# Patient Record
Sex: FEMALE
[De-identification: names, ages, dates, MRNs, and addresses within clinical notes are randomized per-mention and may not be internally consistent; named-entity substitution may affect disease eponyms.]

---

## 2019-02-05 PROBLEM — Z00.00 ENCOUNTER FOR PREVENTIVE HEALTH EXAMINATION: Status: ACTIVE | Noted: 2019-02-05

## 2019-02-15 ENCOUNTER — APPOINTMENT (OUTPATIENT)
Dept: NEUROSURGERY | Facility: CLINIC | Age: 74
End: 2019-02-15
Payer: MEDICARE

## 2019-02-15 VITALS
HEART RATE: 75 BPM | DIASTOLIC BLOOD PRESSURE: 75 MMHG | SYSTOLIC BLOOD PRESSURE: 149 MMHG | OXYGEN SATURATION: 95 % | RESPIRATION RATE: 16 BRPM

## 2019-02-15 DIAGNOSIS — M51.36 OTHER INTERVERTEBRAL DISC DEGENERATION, LUMBAR REGION: ICD-10-CM

## 2019-02-15 DIAGNOSIS — M54.10 RADICULOPATHY, SITE UNSPECIFIED: ICD-10-CM

## 2019-02-15 DIAGNOSIS — Z78.9 OTHER SPECIFIED HEALTH STATUS: ICD-10-CM

## 2019-02-15 DIAGNOSIS — M54.5 LOW BACK PAIN: ICD-10-CM

## 2019-02-15 DIAGNOSIS — E11.9 TYPE 2 DIABETES MELLITUS W/OUT COMPLICATIONS: ICD-10-CM

## 2019-02-15 PROCEDURE — 99205 OFFICE O/P NEW HI 60 MIN: CPT

## 2019-02-16 PROBLEM — E11.9 DIABETES: Status: RESOLVED | Noted: 2019-02-16 | Resolved: 2019-02-16

## 2019-02-16 PROBLEM — Z78.9 NON-SMOKER: Status: ACTIVE | Noted: 2019-02-16

## 2019-02-16 PROBLEM — M51.36 LUMBAR DEGENERATIVE DISC DISEASE: Status: ACTIVE | Noted: 2019-02-16

## 2019-02-16 PROBLEM — M54.5 LOW BACK PAIN: Status: ACTIVE | Noted: 2019-02-15

## 2019-02-16 NOTE — HISTORY OF PRESENT ILLNESS
[de-identified] : 73 year old woman with past medical history diabetes referred by Dr. Mary Babcock for comprehensive neurosurgical evaluation of lumbar spine. She reports a history of low back pain radiating to bilateral legs, RIGHT worse than LEFT for several years. She reports that pain is exacerbated with walking. Denies numbness/tingling.\par \par Has done physical therapy with minimal improvement. She has not been evaluated by pain management. \par She brings MRI lumbar spine from 7/20/18 for review. \par Denies leg weakness, bowel/bladder incontinence.

## 2019-02-16 NOTE — CONSULT LETTER
[FreeTextEntry2] : Dr. Mary Babcock\par [address]\[place] [address] [FreeTextEntry3] : Patricio Jacobson MD

## 2019-02-16 NOTE — DATA REVIEWED
[de-identified] : MRI lumbar spine 7/20/18: Impression:\par There is mild dextroscoliosis of the lumbar spine which may be in part positional.\par \par Mild grade 1 anterolisthesis at the L4-L5 level.\par \par Multilevel degenerative disc and endplate changes with hypertrophy of the ligamentum flavum and degenerative changes of the facet joints, as described. Accounting for differences in technique, there are no significant changes when compared to the prior MRI of the lumbar spine of 3/17/2011. \par \par L3-L4: Mild canal stenosis with moderate narrowing of the left lateral recess and neural foramen and mild right foraminal narrowing, THere is contact of the exiting left L3 nerve root and likely of the descending left L4 nerve root.\par \par L2-L3: Mild canal stenosis and moderate left and mild right foraminal narrowing. There is contact of the esiting Left L2 nerve root.\par \par L4-L5: Mild canal stenosis with moderate right and left foraminal narrowing. There is encroachment upon the exiting right L4 nerve root.

## 2019-02-16 NOTE — PHYSICAL EXAM
[Oriented To Time, Place, And Person] : oriented to person, place, and time [Motor Tone] : muscle tone was normal in all four extremities [Motor Strength] : muscle strength was normal in all four extremities [Sclera] : the sclera and conjunctiva were normal [Outer Ear] : the ears and nose were normal in appearance [Neck Appearance] : the appearance of the neck was normal [] : no respiratory distress [Respiration, Rhythm And Depth] : normal respiratory rhythm and effort [Heart Rate And Rhythm] : heart rate was normal and rhythm regular [Abnormal Walk] : normal gait [Skin Color & Pigmentation] : normal skin color and pigmentation

## 2019-02-16 NOTE — CONSULT LETTER
[FreeTextEntry2] : Dr. Mary Babcock\par [address]\[place] [address] [FreeTextEntry3] : Patricio Jacobson MD

## 2019-02-16 NOTE — DATA REVIEWED
[de-identified] : MRI lumbar spine 7/20/18: Impression:\par There is mild dextroscoliosis of the lumbar spine which may be in part positional.\par \par Mild grade 1 anterolisthesis at the L4-L5 level.\par \par Multilevel degenerative disc and endplate changes with hypertrophy of the ligamentum flavum and degenerative changes of the facet joints, as described. Accounting for differences in technique, there are no significant changes when compared to the prior MRI of the lumbar spine of 3/17/2011. \par \par L3-L4: Mild canal stenosis with moderate narrowing of the left lateral recess and neural foramen and mild right foraminal narrowing, THere is contact of the exiting left L3 nerve root and likely of the descending left L4 nerve root.\par \par L2-L3: Mild canal stenosis and moderate left and mild right foraminal narrowing. There is contact of the esiting Left L2 nerve root.\par \par L4-L5: Mild canal stenosis with moderate right and left foraminal narrowing. There is encroachment upon the exiting right L4 nerve root.

## 2019-02-16 NOTE — PLAN
[FreeTextEntry1] : MRI lumbar spine reviewed by Dr. Jacobson with patient. \par No surgery recommended.\par Recommend physical therapy and consultation with pain management\par Educated regarding s/s neurological worsening including weakness, gait difficulty, incontinence severe pain, return to office or report to ED.\par \par Plan:\par \par 1. Physical therapy\par 2. Pain management - referral and contact information provided to Dr. Jeremi Guallpa\par \par Patient verbalizes agreement and understanding with plan.\par

## 2019-02-16 NOTE — HISTORY OF PRESENT ILLNESS
[de-identified] : 73 year old woman with past medical history diabetes referred by Dr. Mary Babcock for comprehensive neurosurgical evaluation of lumbar spine. She reports a history of low back pain radiating to bilateral legs, RIGHT worse than LEFT for several years. She reports that pain is exacerbated with walking. Denies numbness/tingling.\par \par Has done physical therapy with minimal improvement. She has not been evaluated by pain management. \par She brings MRI lumbar spine from 7/20/18 for review. \par Denies leg weakness, bowel/bladder incontinence.

## 2020-02-25 ENCOUNTER — APPOINTMENT (OUTPATIENT)
Dept: NEPHROLOGY | Facility: CLINIC | Age: 75
End: 2020-02-25
Payer: MEDICARE

## 2020-02-25 VITALS — HEART RATE: 70 BPM | SYSTOLIC BLOOD PRESSURE: 120 MMHG | DIASTOLIC BLOOD PRESSURE: 65 MMHG

## 2020-02-25 DIAGNOSIS — R30.0 DYSURIA: ICD-10-CM

## 2020-02-25 DIAGNOSIS — N18.3 CHRONIC KIDNEY DISEASE, STAGE 3 (MODERATE): ICD-10-CM

## 2020-02-25 DIAGNOSIS — I10 ESSENTIAL (PRIMARY) HYPERTENSION: ICD-10-CM

## 2020-02-25 PROCEDURE — 99203 OFFICE O/P NEW LOW 30 MIN: CPT

## 2020-02-25 RX ORDER — LOSARTAN POTASSIUM 50 MG/1
50 TABLET, FILM COATED ORAL
Refills: 0 | Status: ACTIVE | COMMUNITY

## 2020-02-25 RX ORDER — SIMVASTATIN 20 MG/1
20 TABLET, FILM COATED ORAL
Refills: 0 | Status: ACTIVE | COMMUNITY

## 2020-02-26 LAB
APPEARANCE: ABNORMAL
BACTERIA: NEGATIVE
BILIRUBIN URINE: NEGATIVE
BLOOD URINE: NEGATIVE
COLOR: NORMAL
GLUCOSE QUALITATIVE U: NEGATIVE
HYALINE CASTS: 0 /LPF
KETONES URINE: NEGATIVE
LEUKOCYTE ESTERASE URINE: ABNORMAL
MICROSCOPIC-UA: NORMAL
NITRITE URINE: NEGATIVE
PH URINE: 5
PROTEIN URINE: NEGATIVE
RED BLOOD CELLS URINE: 1 /HPF
SPECIFIC GRAVITY URINE: 1.02
SQUAMOUS EPITHELIAL CELLS: 4 /HPF
UROBILINOGEN URINE: NORMAL
WHITE BLOOD CELLS URINE: 27 /HPF

## 2020-02-27 LAB — BACTERIA UR CULT: NORMAL

## 2020-02-27 NOTE — PHYSICAL EXAM
[General Appearance - In No Acute Distress] : in no acute distress [General Appearance - Well-Appearing] : healthy appearing [Jugular Venous Distention Increased] : there was no jugular-venous distention [Heart Sounds] : normal S1 and S2 [Auscultation Breath Sounds / Voice Sounds] : lungs were clear to auscultation bilaterally [Heart Rate And Rhythm] : heart rate was normal and rhythm regular [Heart Sounds Gallop] : no gallops [Murmurs] : no murmurs [Heart Sounds Pericardial Friction Rub] : no pericardial rub [Edema] : there was no peripheral edema [Abdomen Tenderness] : non-tender [No CVA Tenderness] : no ~M costovertebral angle tenderness [Abdomen Soft] : soft [Abnormal Walk] : normal gait [Oriented To Time, Place, And Person] : oriented to person, place, and time [General Appearance - Alert] : alert [Sclera] : the sclera and conjunctiva were normal [] : no rash [No Focal Deficits] : no focal deficits [Affect] : the affect was normal

## 2020-02-27 NOTE — CONSULT LETTER
[Dear  ___] : Dear  [unfilled], [Consult Letter:] : I had the pleasure of evaluating your patient, [unfilled]. [Please see my note below.] : Please see my note below. [Consult Closing:] : Thank you very much for allowing me to participate in the care of this patient.  If you have any questions, please do not hesitate to contact me. [Sincerely,] : Sincerely, [FreeTextEntry3] : Jony Richardson MD, DEBORAH\par Division of Nephrology\par [facility]\par  of Medicine\par [facility] [employer] \par \par \par \par \par

## 2020-02-27 NOTE — ASSESSMENT
[FreeTextEntry1] : \par -CKD stage III likely from longstanding HTN and DM\par stable with baseline 1.1-1.2, latest labs with Cr 0.9\par lytes and volume status in acceptable range, minimal microalbuminuria last check \par - HTN, well controlled and at goal\par cw losartan 50 mg daily\par - Dysuria-- withj check UA and cx-- agree could be vaginitis and consider gyn eval (says hasn’t done) -- also consider f/u with  as well (interstitial cystitis? ) especially if ucx remain negative with leukocyturia and if gyn eval  and rx unrevealing \par \par f/u in one year re CKD

## 2020-02-27 NOTE — HISTORY OF PRESENT ILLNESS
[FreeTextEntry1] : 75 y/o AAF referred by Dr Babcock for CKD stage 3 \par her PMH is sig for HTN/HLD and DM currently off medications.\par she has been having dysuria for a few weeks also had burning and difficulty with urination in sep 2019 when she was evaluated by urology and was told the work up was negative and unremarkable\par told sx could be vaginitis \par denies hematuria\par baseline Cr 1.1-1.2 x years / but on most recent labs in Dec was 0.9\par UA with 1+ leuk est and pyuria \par minimal proteinuria, microalbuminuria 33\par Bp well controlled\par

## 2020-02-27 NOTE — REVIEW OF SYSTEMS
[Dysuria] : dysuria [Negative] : Heme/Lymph [Incontinence] : no incontinence [Pelvic Pain] : no pelvic pain [Vaginal Discharge] : no vaginal discharge

## 2020-10-01 PROBLEM — M54.10 BILATERAL RADIATING LEG PAIN: Status: ACTIVE | Noted: 2019-02-15

## 2021-02-23 ENCOUNTER — APPOINTMENT (OUTPATIENT)
Dept: NEPHROLOGY | Facility: CLINIC | Age: 76
End: 2021-02-23